# Patient Record
Sex: FEMALE | Race: BLACK OR AFRICAN AMERICAN
[De-identification: names, ages, dates, MRNs, and addresses within clinical notes are randomized per-mention and may not be internally consistent; named-entity substitution may affect disease eponyms.]

---

## 2020-11-05 LAB
BASOPHILS # BLD AUTO: 0 10*3/UL (ref 0–0.1)
BASOPHILS NFR BLD AUTO: 0.4 % (ref 0–1)
DEPRECATED NEUTROPHILS # BLD AUTO: 3.9 10*3/UL (ref 2.1–6.9)
EOSINOPHIL # BLD AUTO: 0.2 10*3/UL (ref 0–0.4)
EOSINOPHIL NFR BLD AUTO: 2.2 % (ref 0–6)
ERYTHROCYTE [DISTWIDTH] IN CORD BLOOD: 14 % (ref 11.7–14.4)
HCT VFR BLD AUTO: 37.9 % (ref 34.2–44.1)
HGB BLD-MCNC: 12.1 G/DL (ref 12–16)
LYMPHOCYTES # BLD: 2 10*3/UL (ref 1–3.2)
LYMPHOCYTES NFR BLD AUTO: 29.8 % (ref 18–39.1)
MCH RBC QN AUTO: 28.3 PG (ref 28–32)
MCHC RBC AUTO-ENTMCNC: 31.9 G/DL (ref 31–35)
MCV RBC AUTO: 88.6 FL (ref 81–99)
MONOCYTES # BLD AUTO: 0.6 10*3/UL (ref 0.2–0.8)
MONOCYTES NFR BLD AUTO: 8.8 % (ref 4.4–11.3)
NEUTS SEG NFR BLD AUTO: 58.7 % (ref 38.7–80)
PLATELET # BLD AUTO: 232 X10E3/UL (ref 140–360)
RBC # BLD AUTO: 4.28 X10E6/UL (ref 3.6–5.1)

## 2020-11-09 ENCOUNTER — HOSPITAL ENCOUNTER (INPATIENT)
Dept: HOSPITAL 88 - OR | Age: 30
LOS: 1 days | Discharge: HOME | DRG: 621 | End: 2020-11-10
Attending: INTERNAL MEDICINE | Admitting: INTERNAL MEDICINE
Payer: COMMERCIAL

## 2020-11-09 VITALS — SYSTOLIC BLOOD PRESSURE: 151 MMHG | DIASTOLIC BLOOD PRESSURE: 90 MMHG

## 2020-11-09 VITALS — SYSTOLIC BLOOD PRESSURE: 156 MMHG | DIASTOLIC BLOOD PRESSURE: 77 MMHG

## 2020-11-09 VITALS — HEIGHT: 64 IN | BODY MASS INDEX: 50.02 KG/M2 | WEIGHT: 293 LBS

## 2020-11-09 VITALS — DIASTOLIC BLOOD PRESSURE: 90 MMHG | SYSTOLIC BLOOD PRESSURE: 151 MMHG

## 2020-11-09 VITALS — DIASTOLIC BLOOD PRESSURE: 77 MMHG | SYSTOLIC BLOOD PRESSURE: 156 MMHG

## 2020-11-09 DIAGNOSIS — K21.9: ICD-10-CM

## 2020-11-09 DIAGNOSIS — R03.0: ICD-10-CM

## 2020-11-09 DIAGNOSIS — J45.909: ICD-10-CM

## 2020-11-09 DIAGNOSIS — K44.9: ICD-10-CM

## 2020-11-09 DIAGNOSIS — E66.01: Primary | ICD-10-CM

## 2020-11-09 DIAGNOSIS — I11.9: ICD-10-CM

## 2020-11-09 DIAGNOSIS — Z11.59: ICD-10-CM

## 2020-11-09 PROCEDURE — 0DB64Z3 EXCISION OF STOMACH, PERCUTANEOUS ENDOSCOPIC APPROACH, VERTICAL: ICD-10-PCS | Performed by: SURGERY

## 2020-11-09 PROCEDURE — 0BQT4ZZ REPAIR DIAPHRAGM, PERCUTANEOUS ENDOSCOPIC APPROACH: ICD-10-PCS | Performed by: SURGERY

## 2020-11-09 PROCEDURE — 85025 COMPLETE CBC W/AUTO DIFF WBC: CPT

## 2020-11-09 PROCEDURE — 83735 ASSAY OF MAGNESIUM: CPT

## 2020-11-09 PROCEDURE — 36415 COLL VENOUS BLD VENIPUNCTURE: CPT

## 2020-11-09 PROCEDURE — 84100 ASSAY OF PHOSPHORUS: CPT

## 2020-11-09 PROCEDURE — 80053 COMPREHEN METABOLIC PANEL: CPT

## 2020-11-09 PROCEDURE — 96361 HYDRATE IV INFUSION ADD-ON: CPT

## 2020-11-09 PROCEDURE — 81025 URINE PREGNANCY TEST: CPT

## 2020-11-09 RX ADMIN — SODIUM CHLORIDE PRN MG: 900 INJECTION INTRAVENOUS at 23:30

## 2020-11-09 RX ADMIN — Medication PRN MG: at 16:31

## 2020-11-09 RX ADMIN — Medication PRN MG: at 17:42

## 2020-11-09 RX ADMIN — SODIUM CHLORIDE, POTASSIUM CHLORIDE, SODIUM LACTATE AND CALCIUM CHLORIDE SCH MLS/HR: 600; 310; 30; 20 INJECTION, SOLUTION INTRAVENOUS at 16:33

## 2020-11-09 RX ADMIN — Medication PRN MG: at 23:45

## 2020-11-09 RX ADMIN — ENOXAPARIN SODIUM SCH MG: 40 INJECTION SUBCUTANEOUS at 23:00

## 2020-11-09 RX ADMIN — SODIUM CHLORIDE PRN MG: 900 INJECTION INTRAVENOUS at 16:31

## 2020-11-09 RX ADMIN — Medication PRN MG: at 20:45

## 2020-11-09 NOTE — OPERATIVE REPORT
DATE OF PROCEDURE:  11/09/2020

 

SURGEON:  Abdelrahman Medina MD

 

PREOPERATIVE DIAGNOSIS:  Hiatal hernia.

 

POSTOPERATIVE DIAGNOSIS:  Hiatal hernia.

 

PREOPERATIVE INDICATION:  Treat disease, prevent hiatal hernia related complications.

 

PROCEDURE:  Laparoscopic hiatal hernia repair (CPT 64430).

 

ANESTHESIA:  General.

 

ASSISTANT:  Bharath Antunez, surgical first assistant (needed due to complexity of

case). 

 

FLUIDS:  900 mL of crystalloid.

 

ESTIMATED BLOOD LOSS:  20 mL.

 

DRAINS:  None.

 

COMPLICATIONS:  None.

 

SPECIMENS:  None.

 

GRAFTS:  None.

 

FINDINGS:  Small hiatal hernia.

 

PROCEDURE IN DETAIL:  The patient was brought to the operating room and was intubated

under general endotracheal anesthesia.  She was sterilely prepped and draped in the

usual fashion.  A preprocedure pause was performed identifying the patient, use of

perioperative antibiotics, intended procedure, and staff surgeon. 

 

Access was gained via a 5 mm left subcostal incision using a Veress needle.  The abdomen

was insufflated.  Four additional trocars were placed in standard position.  A liver

retractor was used to expose the stomach and the hiatus.  I incised the gastrohepatic

ligament, staying proximal to a replaced left hepatic artery, which was encountered.  I

then was able to mobilize and dissect out a hiatal hernia from the right and left archie

of the diaphragm as well as anteriorly.  The hiatus was repaired with 0 Surgidac suture

in an interrupted fashion.  Next, we then verified hemostasis, removed the liver

retractor, and desufflated the abdomen.  Trocars were removed.  The large port site was

closed with 0 Vicryl suture using a [place]-Abilio technique.  The incision sites were

closed with 4-0 Monocryl suture in a subcuticular fashion.  Dermabond dressings were

applied.  A 0.25% bupivacaine was used both at the preperitoneal incision sites.  The

patient tolerated the 

procedure well.  Type of wound was type 1, clean.  All surgical sponge and instrument

counts were correct. 

 

 

 

 

______________________________

Abdelrahman Medina MD

 

[facility]

D:  11/09/2020 14:11:16

T:  11/09/2020 17:28:50

Job #:  [id number]

## 2020-11-09 NOTE — OPERATIVE REPORT
DATE OF PROCEDURE:  11/09/2020

 

SURGEON:  Abdelrahman Medina MD

 

This is a self-pay portion.

 

PREOPERATIVE DIAGNOSIS:  Morbid obesity, BMI 57.

 

POSTOPERATIVE DIAGNOSIS:  Morbid obesity, BMI 57.

 

PREOPERATIVE INDICATION:  Treat disease, prevent complications related to comorbid

conditions of obesity. 

 

PROCEDURE:  Laparoscopic vertical sleeve gastrectomy.

 

ANESTHESIA:  General.

 

ASSISTANT:  Bharath Antunez, surgical first assistant (needed due to complexity of

case). 

 

FLUIDS:  900 mL crystalloid.

 

ESTIMATED BLOOD LOSS:  20 mL.

 

DRAINS:  None.

 

COMPLICATIONS:  None.

 

SPECIMENS:  Partial stomach.

 

GRAFTS:  None.

 

FINDINGS:  Negative leak test.

 

PROCEDURE IN DETAIL:  After fixing her hiatal hernia, I then mobilized the greater

curvature of stomach from about 4 cm proximal to the pyloric valve to the left archie of

the diaphragm using the [place] LigaSure device.  I then had Anesthesia inserted a

32-[place] ViSiGi tube along the lesser curvature of the stomach.  The greater curvature

of the stomach was resected with multiple firings of a 60 mm purple load [employer]

stapling device, which was reinforced with TRS.  Once that was completed, we submerged

the sleeve under saline and did a leak test, no leaks were identified.  The specimen was

removed through the right periumbilical port site.  The port site was closed with 0

Vicryl suture using a [place]-Abilio technique.  We then verified hemostasis, removed

the liver tractor, and desufflated the abdomen.  Trocars were removed.  Incision sites

were closed with 4-0 Monocryl suture in a subcuticular fashion.  Dermabond dressings

were applied.  A 0.25% bupivacaine was used both at the preperitoneal incision sites. 

The patient tolerated the procedure well.  Type of wound is type 2, clean, contaminated.

 All surgical sponge and instrument counts were correct. 

 

 

 

______________________________

Abdelrahman Medina MD

 

[facility] [place]

D:  11/09/2020 14:12:54

T:  11/09/2020 17:35:09

Job #:  [id number]

## 2020-11-09 NOTE — NUR
Notified Dr Medina regarding patient c/o of pain 10/10 and morphine due at this time but 
patient reporting morphine not helping. New order for toradol 30mg iv x1. Okay for patient 
to have ice chips.

## 2020-11-09 NOTE — NUR
Assisted patient to turn to right side with pillows in place, tolerating well. Will continue 
to monitor.

## 2020-11-09 NOTE — NUR
Assisted patient to turn to left side with pillow in place, tolerating well. Will continue 
to monitor.

## 2020-11-09 NOTE — XMS REPORT
Clinical Summary

                             Created on: 2020



Gilford, Beatrice C

External Reference #: [id number]

: 1990

Sex: Female



Demographics





                          Address                   [address] [place] [address]  [address]

 

                          Home Phone                [phone]

 

                          Preferred Language        English

 

                          Marital Status            Unknown

 

                          Sabianism Affiliation     Unknown

 

                          Race                      Unknown

 

                          Ethnic Group              Unknown





Author





                          Author                    [facility]

 

                          Address                   Unknown

 

                          Phone                     Unavailable







Support





                Name            Relationship    Address         Phone

 

                Sylvie Johnson       EC[employer]            Unknown         [phone]







Care Team Providers





                    Care Team Member Name Role                Phone

 

                    Maria De Jesus Escalona MD PCP                 [phone]







Allergies

No Known Allergies



Medications





                          End Date                  Status



              Medication   Sig          Dispensed    Refills      Start  



                                         Date  

 

                                                    Active



              fluconazole (DIFLUCAN)  Take ii po   8 tablet     0              



                     100 MG tablet       now and then        0  



                                         one po daily.     

 

                          2021                Active



              hydroCHLOROthiazide  Take 1 tablet  90 tablet    2              



                     (HYDRODIURIL) 25 MG  (25 mg total)       0  



                           tablet                    by mouth     



                                         daily.     







Active Problems





Not on file



Encounters





                          Care Team                 Description



                     Date                Type                Specialty  

 

                                        



Stacy Mae CMA                   Advice Only (ENT information)



                     2020          Telephone           Family Medicine  

 

                                        



Maria De Jesus Escalona MD       Essential hypertension (Primary Dx); 

Hyperhidrosis; 

Chronic fatigue; 

Exposure to STD; 

Family history of hyperlipidemia; 

BMI 50.0-59.9, adult (HCC)



                     2020          Office Visit        Family Medicine  



after 2019



Social History





                                        Date



                 Tobacco Use     Types           Packs/Day       Years Used 

 

                                         



                                         Never Assessed    







 



                           Sex Assigned at Birth     Date Recorded

 

 



                                         Not on file 







Last Filed Vital Signs





                    Reading             Time Taken          Comments



                                         Vital Sign   

 

                    132/78              2020 11:03 AM CDT  



                                         Blood Pressure   

 

                    72                  2020 11:03 AM CDT  



                                         Pulse   

 

                    36.2 C (97.1 F) 2020 11:03 AM CDT  



                                         Temperature   

 

                    -                   -                    



                                         Respiratory Rate   

 

                    100%                2020 11:03 AM CDT  



                                         Oxygen Saturation   

 

                    -                   -                    



                                         Inhaled Oxygen   



                                         Concentration   

 

                    148.1 kg (326 lb 9.6 oz) 2020 11:03 AM CDT  



                                         Weight   

 

                    162.6 cm (5' 4")    2020 11:03 AM CDT  



                                         Height   

 

                    56.06               2020 11:03 AM CDT  



                                         Body Mass Index   







Plan of Treatment





                          Care Team                 Description



                     Date                Type                Specialty  

 

                                        



Maria De Jesus Escalona MD



[address]



[place] [address] [phone] (Fax)                       



                     2020          Office Visit        Family Medicine  







   



                 Health Maintenance  Due Date        Last Done       Comments

 

   



                           CERVICAL CANCER SCREENING  2010  



                                         PAP ONLY (Age 21-65)   

 

   



                           INFLUENZA VACCINE (#1)    2020  

 

   



                     LIPID PANEL         2023 







Procedures





                                        Comments



                 Procedure Name  Priority        Date/Time       Associated Diag

nosis 

 

                                        



Results for this procedure are in the results section.



                 RPR             Routine         2020      Exposure to STD

 



                                         1:04 PM CDT  

 

                                        



Results for this procedure are in the results section.



                 TSH/FREE T4 IF INDICATED  Routine         2020      Chron

ic fatigue 



                                         1:04 PM CDT  

 

                                        



Results for this procedure are in the results section.



                 HIV-1 ANTIGEN WITH  Routine         2020      Exposure to

 STD 



                           HIV-1/2 ANTIBODY          1:04 PM CDT  

 

                                        



Results for this procedure are in the results section.



                 LIPID PANEL     Routine         2020      Essential hyper

tension 



                           1:04 PM CDT               Hyperhidrosis 



                                         Chronic fatigue 



                                         Exposure to STD 



                                         Family history of 



                                         hyperlipidemia 



                                         BMI 50.0-59.9, adult 



                                         (HCC) 

 

                                        



Results for this procedure are in the results section.



                 COMPREHENSIVE METABOLIC  Routine         2020      Essent

ial hypertension 



                     PANEL               1:04 PM CDT         Hyperhidrosis 



                                         Chronic fatigue 



                                         Exposure to STD 



                                         Family history of 



                                         hyperlipidemia 



                                         BMI 50.0-59.9, adult 



                                         (HCC) 

 

                                        



Results for this procedure are in the results section.



                 T3, FREE        Routine         2020      Essential hyper

tension 



                           1:04 PM CDT               Hyperhidrosis 



                                         Chronic fatigue 



                                         Exposure to STD 



                                         Family history of 



                                         hyperlipidemia 



                                         BMI 50.0-59.9, adult 



                                         (HCC) 



after 2019



Results

* TSH/Free T4 If Indicated (2020  1:04 PM CDT)



    



              Component    Value        Ref Range    Performed At  Pathologist



                                         Signature

 

    



                 TSH w/reflex to  2.46            mIU/L           [facility] 



                           FT4                       Comment:   



                                             Reference Range   



                                                



                                             > or = 20 Years   



                                         0.40-4.50   



                                                



                                                  



                                         Pregnancy Ranges   



                                             First trimester   



                                          0.26-2.66   



                                             Second   



                                         trimester  0.55-2.73   



                                             Third trimester   



                                          0.43-2.91   











                                         Specimen

 









 



                           Narrative                 Performed At

 

 



                           FASTING:YES               [facility]



                                         FASTING: YES 







                                        Resulting Agency Comment

 

                                        



Performing Organization Information:



  Site ID: [facility]



  Name: [facility]



  Address: [address] [place] [id number]



  Director: Torito Godoy







   



                 Performing Organization  Address         City/[facility]

one Number

 

   



                     [facility]               [address] [place] [address]

45 

 

   



                                         [facility]   





* HIV-1 Antigen with HIV-1/2 Antibody (2020  1:04 PM CDT)



    



              Component    Value        Ref Range    Performed At  Pathologist



                                         Signature

 

    



                 HIV AG/AB, 4TH  NON-REACTIVE    NON-REACTIVE    [facility] 



                           GEN ([facility])               Comment:   



                                         HIV-1 antigen and HIV-1/HIV-2   



                                         antibodies were not   



                                         detected. There is no   



                                         laboratory evidence of HIV   



                                         infection.   



                                         PLEASE NOTE: This information   



                                         has been disclosed to   



                                         you from records whose   



                                         confidentiality may be   



                                         protected by state law. If   



                                         your state requires such   



                                         protection, then the state law   



                                         prohibits you from   



                                         making any further disclosure   



                                         of the information   



                                         without the specific written   



                                         consent of the person   



                                         to whom it pertains, or as   



                                         otherwise permitted by law.   



                                         A general authorization for   



                                         the release of medical or   



                                         other information is NOT   



                                         sufficient for this purpose.   



                                            



                                         For additional information   



                                         please refer to   



                                         http://education.[employer].MyMedLeads.com/faq/TTB593   



                                         (This link is being provided   



                                         for informational/   



                                         educational purposes only.)   



                                         The performance of this assay   



                                         has not been clinically   



                                         validated in patients less   



                                         than 2 years old.   











                                         Specimen

 









 



                           Narrative                 Performed At

 

 



                           FASTING:YES               [facility]



                                         FASTING: YES 







                                        Resulting Agency Comment

 

                                        



Performing Organization Information:



  Site ID: [facility]



  Name: [facility]



  Address: [address] [place], [place] [id number]



  Director: Torito Godoy







   



                 Performing Organization  Address         City/State/Zipcode  Ph

one Number

 

   



                     [facility]               [address] [place] [address]

45 

 

   



                                         [facility]   





* RPR (2020  1:04 PM CDT)



    



              Component    Value        Ref Range    Performed At  Pathologist



                                         Signature

 

    



                 RPR             NON-REACTIVE    NON-REACTIVE    [facility] 











                                         Specimen

 









 



                           Narrative                 Performed At

 

 



                           FASTING:YES               [facility]



                                         FASTING: YES 







                                        Resulting Agency Comment

 

                                        



Performing Organization Information:



  Site ID: [facility]



  Name: [facility]



  Address: [address] [place] [phone]



  Director: Torito Godoy







   



                 Performing Organization  Address         City/[facility]

one Number

 

   



                     [facility]               [address] [place] [phone]

45 

 

   



                                         [facility]   





* T3, free (2020  1:04 PM CDT)



    



              Component    Value        Ref Range    Performed At  Pathologist



                                         Signature

 

    



                 T3, Free        2.6             2.3 - 4.2 pg/mL  [facility] 











                                         Specimen

 





                                         Blood







 



                           Narrative                 Performed At

 

 



                           FASTING:YES               [facility]



                                         FASTING: YES 







                                        Resulting Agency Comment

 

                                        



Performing Organization Information:



  Site ID: [facility]



  Name: [facility]



  Address: [address] [place] [address]



  Director: Torito Godoy







   



                 Performing Organization  Address         City/State/[facility]

one Number

 

   



                     [facility]               [address] [place] [address]

45 

 

   



                                         [facility]   





* Lipid panel (2020  1:04 PM CDT)



    



              Component    Value        Ref Range    Performed At  Pathologist



                                         Signature

 

    



                 Cholesterol,    189             <200 mg/dL      [facility] 



                                         Total    

 

    



                 HDL Cholesterol  55              > OR = 50 mg/dL  [facility] 

 

    



                 Triglycerides   54              <150 mg/dL      [facility] 

 

    



                 LDL Cholesterol  119 (H)         mg/dL (calc)    [facility] 



                                         Comment:   



                                         Reference range: <100   



                                         Desirable range <100 mg/dL for   



                                         primary prevention;    



                                         <70 mg/dL for patients with   



                                         CHD or diabetic patients   



                                         with > or = 2 CHD risk   



                                         factors.   



                                         LDL-C is now calculated using   



                                         the Joaquín-Katerina   



                                         calculation, which is a   



                                         validated novel method   



                                         providing   



                                         better accuracy than the   



                                         Friedewald equation in the   



                                         estimation of LDL-C.   



                                         Joqauín SS et al. DHARMESH.   



                                         2013;310(19): 7396-4122   



                                         (http://education.[employer].com/faq/WIJ268)   

 

    



                 Chol/HDL Ratio  3.4             <5.0 (calc)     [facility] 

 

    



                 Non-HDL         134 (H)         <130 mg/dL (calc)  [facility] 



                           Cholesterol               Comment:   



                                         For patients with diabetes   



                                         plus 1 major ASCVD risk   



                                         factor, treating to a   



                                         non-HDL-C goal of <100 mg/dL   



                                         (LDL-C of <70 mg/dL) is   



                                         considered a therapeutic   



                                         option.   











                                         Specimen

 





                                         Blood







 



                           Narrative                 Performed At

 

 



                           FASTING:YES               [facility]



                                         FASTING: YES 







                                        Resulting Agency Comment

 

                                        



Performing Organization Information:



  Site ID: [facility]



  Name: [facility]



  Address: [address] [place] [phone]



  Director: Torito Godoy







   



                 Performing Organization  Address         City/[facility]

one Number

 

   



                     [facility]               [address] [place] [address]

[address] 

 

   



                                         [facility]   





* Comprehensive metabolic panel (2020  1:04 PM CDT)



    



              Component    Value        Ref Range    Performed At  Pathologist



                                         Signature

 

    



                 Glucose         89              65 - 99 mg/dL   [facility] 



                                         Comment:   



                                              Fasting   



                                         reference interval   

 

    



                 BUN             9               7 - 25 mg/dL    [facility] 

 

    



                 Creatinine      0.81            0.50 - 1.10 mg/dL  [facility] 

 

    



                 eGFR If         97              > OR = 60       [facility] 



                           NonAfricn Am              mL/min/1.73m2  

 

    



                 eGFR If Africn  112             > OR = 60       [facility] 



                           Am                        mL/min/1.73m2  

 

    



                 BUN/Creatinine  NOT APPLICABLE  6 - 22 (calc)   [facility] 



                                         Ratio    

 

    



                 Sodium          140             135 - 146 mmol/L  [facility] 

 

    



                 Potassium,      4.1             3.5 - 5.3 mmol/L  [facility] 



                                         Serum    

 

    



                 Chloride        106             98 - 110 mmol/L  [facility] 

 

    



                 Carbon Dioxide,  29              20 - 32 mmol/L  [facility] 



                                         Total    

 

    



                 Calcium, Serum  9.0             8.6 - 10.2 mg/dL  [facility] 

 

    



                 Protein, Total,  6.8             6.1 - 8.1 g/dL  [facility] 



                                         Serum    

 

    



                 Albumin         3.9             3.6 - 5.1 g/dL  [facility] 

 

    



                 GLOBULIN        2.9             1.9 - 3.7 g/dL  [facility] 



                           ([facility])                   (calc)  

 

    



                 Albumin         1.3             1.0 - 2.5 (calc)  [facility] 



                                         Globulin Ratio    

 

    



                 Bilirubin,      0.4             0.2 - 1.2 mg/dL  [facility] 



                                         Total    

 

    



                 Alkaline        48              31 - 125 U/L    [facility] 



                                         Phosphatase, S    

 

    



                 AST (SGOT)      9 (L)           10 - 30 U/L     [facility] 

 

    



                 ALT (SGPT)      9               6 - 29 U/L      [facility] 











                                         Specimen

 





                                         Blood







 



                           Narrative                 Performed At

 

 



                           FASTING:YES               [facility]



                                         FASTING: YES 







                                        Resulting Agency Comment

 

                                        



Performing Organization Information:



  Site ID: [facility]



  Name: [facility]



  Address: [address] [place] [phone]



  Director: Torito Godoy







   



                 Performing Organization  Address         City/State/Zipcode  Ph

one Number

 

   



                     [facility]               [address] [place] [address]

[address] 

 

   



                                         [facility]   





after 2019



Insurance





                                        Type



            Payer      Benefit    Subscriber ID  Effective  Phone      Address 



                           Plan /                    Dates   



                                         Group     

 

                                        PPO



            BLUE CROSS/BLUE SHIELD  [employer] PPO   [id number]  2014-P  [phone]  [address] 

[address] 



                     POS EPO             resent              [id number] 



                           [place], [place] 



                                         [address] 







     



            Guarantor Name  Account    Relation to  Date of    Phone      Mahsa mcghee Address



                     Type                Patient             Birth  

 

     



            Gilford,Beatrice C  Personal/F  Self       1990  [phone] [address]               ([place])              [place] [address]

## 2020-11-09 NOTE — HISTORY AND PHYSICAL
CHIEF COMPLAINT:  "I have weight loss surgery."

 

HISTORY OF PRESENT ILLNESS:  This is a 30-year-old  woman, who 
was

initially admitted to [facility] with diagnosis
of

extreme obesity, BMI 54, complicating underlying GERD as well as her 
prehypertension.

Today, the patient underwent successful laparoscopic sleeve gastrectomy and 
laparoscopic

paraesophageal hiatal hernia repair.  The patient states at this time she does 
have some

pain in her mid epigastric area.  The patient denies any nausea or vomiting.  
The

patient denies any belching or flatus.  The patient has not had bowel movements.


 

REVIEW OF SYSTEMS:

GENERAL:  Weight is stable bur she lost 15 lbs intentionally prior to surgery.  
No fever or chills. 

HEENT:  No headaches.  No vision changes 

CARDIOVASCULAR/RESPIRATORY:  No chest pain.  No short of breath.  No cough. 

GI:  Complains of mid-epigastric pain.  No nausea or vomiting.  Denies any 
belching and

flatus. 

:  No Escobar catheter in place. 

NEUROMUSCULAR:  No limb weakness or numbness.

 

PAST MEDICAL HISTORY:  

1. Childhood asthma.  

2. Prehypertension.

3. Extreme obesity, BMI 54.

4. GERD/hiatal hernia.

 

MEDICATIONS:  

1. Albuterol inhaler two puffs q.i.d. as needed for short of breath and 
wheezing.

2. Multivitamin daily.

3. Collagen supplement once daily.

4. Turmeric supplement once daily.

 

FAMILY HISTORY:  Her fraternal twin sister  in 2016 due to complications of

hypertension and type 2 diabetes.  Her mother has hypertension and generalized

osteoarthritis. 

 

ALLERGIES:  PENICILLIN.

 

PAST SURGICAL HISTORY:  

1. Laparoscopic vertical sleeve gastrectomy today.

2. Laparoscopic paraesophageal hiatal hernia repair.

 

SOCIAL HISTORY:  This woman is single with no children.  She currently lives 
with her mother.  

She is employed as an  for the [employer].  
Denies any tobacco

use.  The patient drinks alcohol socially. 

 

PHYSICAL EXAMINATION:

GENERAL:  She is somnolent, but arousable.  She is very pleasant and 
cooperative.  She

does not appear to be in any obvious distress. 

VITAL SIGNS:  Height 5 feet 4 inches.  Weight is 316 pounds.  BMI 54.  Blood 
pressure is

156/78, pulse 72, respiratory rate 16, oxygen saturation is 97% on room air, 
temperature

98.0. 

INTEGUMENT:  Skin is warm and dry.  No pallor, jaundice, or diaphoresis. 

HEENT:  Anterior sclerae.  Moist mucous membranes. 

NECK:  Supple.  No evidence of jugular venous distention. 

CARDIOVASCULAR:  Distant heart sounds.  Regular rate and rhythm with an S4 
gallop. 

LUNGS:  No rales. No rhonchi.  No wheezes. 

ABDOMEN:  Soft.  She has normal bowel sounds.  The patient's trocar laparoscopic

incisional sites are clean, dry and intact. 

EXTREMITIES:  No edema or deformity.   

NEUROLOGIC:  Intact.



DIAGNOSES:  

1. Status post sleeve gastrectomy.

2. Status post paraesophageal hiatal hernia repair.

3. Extreme obesity, BMI 54.

4. Hypertensive heart disease.

 

PLAN:  

1. Blood pressure monitoring control.  

2. Intravenous fluids.

3. Mobilize the patient.  

4. Initiate enoxaparin to prevent deep venous thrombosis.

5. Encourage incentive spirometer usage to prevent atelectasis.

6. Pain control. 



I would like to thank, Dr. Brown for involving in the care of this patient.



I spent 40 minutes in the care of this patient.

 

 

 

 

______________________________

MD BERNARDINO Mcgraw/PETER

D:  2020 17:47:39

T:  2020 18:46:07

Job #:  [id number]

 

VERONICA

## 2020-11-10 VITALS — DIASTOLIC BLOOD PRESSURE: 96 MMHG | SYSTOLIC BLOOD PRESSURE: 153 MMHG

## 2020-11-10 VITALS — DIASTOLIC BLOOD PRESSURE: 79 MMHG | SYSTOLIC BLOOD PRESSURE: 165 MMHG

## 2020-11-10 VITALS — SYSTOLIC BLOOD PRESSURE: 152 MMHG | DIASTOLIC BLOOD PRESSURE: 78 MMHG

## 2020-11-10 VITALS — SYSTOLIC BLOOD PRESSURE: 153 MMHG | DIASTOLIC BLOOD PRESSURE: 75 MMHG

## 2020-11-10 LAB
ALBUMIN SERPL-MCNC: 3.3 G/DL (ref 3.5–5)
ALBUMIN/GLOB SERPL: 0.8 {RATIO} (ref 0.8–2)
ALP SERPL-CCNC: 40 IU/L (ref 40–150)
ALT SERPL-CCNC: 21 IU/L (ref 0–55)
ANION GAP SERPL CALC-SCNC: 16.1 MMOL/L (ref 8–16)
BASOPHILS # BLD AUTO: 0 10*3/UL (ref 0–0.1)
BASOPHILS NFR BLD AUTO: 0.3 % (ref 0–1)
BUN SERPL-MCNC: 6 MG/DL (ref 7–26)
BUN/CREAT SERPL: 8 (ref 6–25)
CALCIUM SERPL-MCNC: 9 MG/DL (ref 8.4–10.2)
CHLORIDE SERPL-SCNC: 107 MMOL/L (ref 98–107)
CO2 SERPL-SCNC: 20 MMOL/L (ref 22–29)
DEPRECATED NEUTROPHILS # BLD AUTO: 8.5 10*3/UL (ref 2.1–6.9)
DEPRECATED PHOSPHATE SERPL-MCNC: 3.8 MG/DL (ref 2.3–4.7)
EGFRCR SERPLBLD CKD-EPI 2021: > 60 ML/MIN (ref 60–?)
EOSINOPHIL # BLD AUTO: 0 10*3/UL (ref 0–0.4)
EOSINOPHIL NFR BLD AUTO: 0 % (ref 0–6)
ERYTHROCYTE [DISTWIDTH] IN CORD BLOOD: 14.1 % (ref 11.7–14.4)
GLOBULIN PLAS-MCNC: 3.9 G/DL (ref 2.3–3.5)
GLUCOSE SERPLBLD-MCNC: 82 MG/DL (ref 74–118)
HCT VFR BLD AUTO: 37.5 % (ref 34.2–44.1)
HGB BLD-MCNC: 12.2 G/DL (ref 12–16)
LYMPHOCYTES # BLD: 1.6 10*3/UL (ref 1–3.2)
LYMPHOCYTES NFR BLD AUTO: 14.3 % (ref 18–39.1)
MAGNESIUM SERPL-MCNC: 1.8 MG/DL (ref 1.3–2.1)
MCH RBC QN AUTO: 29 PG (ref 28–32)
MCHC RBC AUTO-ENTMCNC: 32.5 G/DL (ref 31–35)
MCV RBC AUTO: 89.3 FL (ref 81–99)
MONOCYTES # BLD AUTO: 1 10*3/UL (ref 0.2–0.8)
MONOCYTES NFR BLD AUTO: 8.6 % (ref 4.4–11.3)
NEUTS SEG NFR BLD AUTO: 76.5 % (ref 38.7–80)
PLATELET # BLD AUTO: 193 X10E3/UL (ref 140–360)
POTASSIUM SERPL-SCNC: 4.1 MMOL/L (ref 3.5–5.1)
RBC # BLD AUTO: 4.2 X10E6/UL (ref 3.6–5.1)
SODIUM SERPL-SCNC: 139 MMOL/L (ref 136–145)

## 2020-11-10 RX ADMIN — Medication PRN MG: at 02:05

## 2020-11-10 RX ADMIN — Medication PRN MG: at 05:30

## 2020-11-10 RX ADMIN — SODIUM CHLORIDE PRN MG: 900 INJECTION INTRAVENOUS at 05:45

## 2020-11-10 RX ADMIN — ENOXAPARIN SODIUM SCH MG: 40 INJECTION SUBCUTANEOUS at 08:31

## 2020-11-10 RX ADMIN — Medication PRN MG: at 08:31

## 2020-11-10 RX ADMIN — SODIUM CHLORIDE, POTASSIUM CHLORIDE, SODIUM LACTATE AND CALCIUM CHLORIDE SCH MLS/HR: 600; 310; 30; 20 INJECTION, SOLUTION INTRAVENOUS at 08:31

## 2020-11-10 RX ADMIN — SODIUM CHLORIDE, POTASSIUM CHLORIDE, SODIUM LACTATE AND CALCIUM CHLORIDE SCH MLS/HR: 600; 310; 30; 20 INJECTION, SOLUTION INTRAVENOUS at 00:03

## 2020-11-10 NOTE — DISCHARGE SUMMARY
ADMITTING DIAGNOSES:  

1. Extreme obesity, BMI of 54, complicating underlying hypertension and 
gastroesophageal

reflux disease. 



DISCHARGE DIAGNOSES:

1. Status post laparoscopic vertical sleeve gastrectomy.

2. Status post laparoscopic hiatal hernia repair.

3. Hypertensive heart disease.

4. Extreme obesity, BMI 54.

 

HOSPITAL COURSE:  This is a 30-year-old  woman, who was 
initially

admitted to [facility] with diagnosis of 
extreme

obesity, BMI of 54.  It was complicating underlying hypertension and GERD.  
During this

hospitalization, the patient underwent successful laparoscopic vertical sleeve

gastrectomy as well as laparoscopic hiatal hernia repair.  These two surgical 
procedures

were performed by her bariatric surgeon namely Dr. Abdelrahman Medina.  The 
patient's brief

hospitalization was unremarkable.  The patient was tolerating clear liquid diet 
on

discharge.  The patient was also ambulating about the halls with no difficulty 
prior to

being discharged. 

 

CONDITION ON DISCHARGE:  The patient's condition on discharge was stable.

 

DISCHARGE MEDICATIONS:  

1. Albuterol inhaler two puffs q.i.d. as needed for shortness of breath and 
wheezing.

2. Multivitamins daily.

3. Ondansetron 4 mg every 6 hours p.r.n. nausea and vomiting, 20 prescribed.  No
refills.

4. Tylenol No. 3 one pill every 6 hours p.r.n. pain, 30 prescribed.  No refills.

 

FOLLOWUP INSTRUCTIONS:  The patient instructed to follow up with Dr. Abdelrahman Medina in

a week and with primary care physician in 2 weeks. 

 

 

 

 

______________________________

MD BERNARDINO Mcgraw/PETER

D:  11/10/2020 08:37:56

T:  11/10/2020 08:54:41

Job #:  [id number]

 

cc:            Abdelrahman Medina MD

 

MTDD

## 2020-11-10 NOTE — NUR
Patient was given a discharge order from Dr. Christina. Patient was given strict dietary 
teaching by the dietician. Patient verbalized all understanding. Patient was given detailed 
discharge instructions, education, and patient verbalized understanding. Patient IV removed 
at 1130 and covered with a C/D/I dressing. Patient states medications were already sent in 
to her pharmacy and her mom already picked them up. Patient had no other issues or 
complaints. 

-------------------------------------------------------------------------------

Addendum: 11/10/20 at 1236 by Devora Shea RN

-------------------------------------------------------------------------------

Patient refused wheelchair, walked to car at 1221. No other issues or complaints.

## 2020-11-10 NOTE — NUR
Bedside report and walking rounds completed with oncoming nurse. Patient in bed with call 
light within reach. Bed locked and in lowest position. POC updated with patient.

## 2020-11-10 NOTE — NUR
Progress note



S: No complaints

O: AF, VSS

General- no acute distress

Abdomen- soft, incisions c/d/i



A/P:  POD 1, s/p Lap hiatal hernia with sleeve gastrectomy



-Clears, ambulate, IS, OOB to chair

-DC home

-F/u in 1-2 weeks; diet instructions given to patient

## 2020-11-10 NOTE — NUR
Patient up ambulating in live. ( distance: room to nurses station and back to room) 
tolerated well.

## 2020-11-10 NOTE — NUR
Assisted patient to turn to left side with pillows in place, tolerating well. Will continue 
to monitor.

## 2020-11-10 NOTE — NUR
Nutrition Screen Note



RD Recommendation for Physician: 

- Advancement to full liquids per MD



Plan of Care: RD following, monitoring for tolerance and adequacy



Nutrition reason for involvement:

MD Consult- post gastric sleeve diet progression education



Primary Diagnose(s): morbid obesity, sleeve gastrectomy



PMH: morbid obesity, HTN, GERD



Ht: 64 in 

Wt: 316 lb

BMI: 54.2 kg/m2

IBW: 120 lb



RD Assessment:

(11/10) 30 YOF admitted for gastric sleeve surgery, seen today for diet education. Pt POD#1 
for sleeve gastrectomy, states that she has not received diet education previously as 
outpatient prior to surgery. Pt receptive to diet education at time of visit. Pt educated on 
progression of CLD to full liquids to puree. Pt educated on protein supplements as well as 
MVI and mineral supplement recommendations. Advised no sugar, carbonated beverages, gum 
chewing, or straws. All questions and concerns addressed at time of visit. Chart reviewed. 
Labs and meds reviewed. Will continue to monitor.



Current Diet: bariatric clear liquids



Malnutrition Evaluation (11/10/20)

The patient does not meet criteria for a specified degree of malnutrition at this time. Will 
re-evaluate at follow-up as appropriate. 



Diet Education Needs Assessment:

Diet education not indicated, pt receptive and education provided 11/10. 



Learner(s): pt 

Barriers: none

Cultural/Language Modifications: none

Readiness: ready

Method: handouts, discussion

Topics: post gastric sleeve diet progression

Understanding/Compliance: good



Diet tolerance: tolerating CLD



Nutrition Care Level: low





Signed: Stephanie Yanes RD, LD, [facility]

## 2023-05-15 NOTE — NUR
Neck , no lymphadenopathy Patient encouraged to get up and walk. Patient ambulated down live to ICU doors and back to 
room. Tolerated well.